# Patient Record
Sex: MALE | Race: WHITE
[De-identification: names, ages, dates, MRNs, and addresses within clinical notes are randomized per-mention and may not be internally consistent; named-entity substitution may affect disease eponyms.]

---

## 2017-12-29 ENCOUNTER — HOSPITAL ENCOUNTER (EMERGENCY)
Dept: HOSPITAL 75 - ER | Age: 22
LOS: 1 days | Discharge: HOME | End: 2017-12-30
Payer: SELF-PAY

## 2017-12-29 VITALS — BODY MASS INDEX: 28.93 KG/M2 | WEIGHT: 180 LBS | HEIGHT: 66 IN

## 2017-12-29 DIAGNOSIS — N45.1: Primary | ICD-10-CM

## 2017-12-29 DIAGNOSIS — F12.10: ICD-10-CM

## 2017-12-29 DIAGNOSIS — F15.10: ICD-10-CM

## 2017-12-29 DIAGNOSIS — F17.210: ICD-10-CM

## 2017-12-29 LAB
ALBUMIN SERPL-MCNC: 4.3 GM/DL (ref 3.2–4.5)
ALP SERPL-CCNC: 90 U/L (ref 40–136)
ALT SERPL-CCNC: 17 U/L (ref 0–55)
APTT PPP: YELLOW S
BACTERIA #/AREA URNS HPF: (no result) /HPF
BASOPHILS # BLD AUTO: 0.1 10^3/UL (ref 0–0.1)
BASOPHILS NFR BLD AUTO: 0 % (ref 0–10)
BILIRUB SERPL-MCNC: 0.8 MG/DL (ref 0.1–1)
BILIRUB UR QL STRIP: NEGATIVE
BUN/CREAT SERPL: 11
CALCIUM SERPL-MCNC: 9.5 MG/DL (ref 8.5–10.1)
CHLORIDE SERPL-SCNC: 103 MMOL/L (ref 98–107)
CO2 SERPL-SCNC: 24 MMOL/L (ref 21–32)
CREAT SERPL-MCNC: 0.76 MG/DL (ref 0.6–1.3)
EOSINOPHIL # BLD AUTO: 0.1 10^3/UL (ref 0–0.3)
EOSINOPHIL NFR BLD AUTO: 1 % (ref 0–10)
ERYTHROCYTE [DISTWIDTH] IN BLOOD BY AUTOMATED COUNT: 13.4 % (ref 10–14.5)
FIBRINOGEN PPP-MCNC: CLEAR MG/DL
GFR SERPLBLD BASED ON 1.73 SQ M-ARVRAT: > 60 ML/MIN
GLUCOSE SERPL-MCNC: 108 MG/DL (ref 70–105)
GLUCOSE UR STRIP-MCNC: NEGATIVE MG/DL
HCT VFR BLD CALC: 44 % (ref 40–54)
HGB BLD-MCNC: 15.6 G/DL (ref 13.3–17.7)
KETONES UR QL STRIP: NEGATIVE
LEUKOCYTE ESTERASE UR QL STRIP: (no result)
LYMPHOCYTES # BLD AUTO: 2.3 X 10^3 (ref 1–4)
LYMPHOCYTES NFR BLD AUTO: 13 % (ref 12–44)
MANUAL DIFFERENTIAL PERFORMED BLD QL: YES
MCH RBC QN AUTO: 31 PG (ref 25–34)
MCHC RBC AUTO-ENTMCNC: 36 G/DL (ref 32–36)
MCV RBC AUTO: 85 FL (ref 80–99)
MONOCYTES # BLD AUTO: 2 X 10^3 (ref 0–1)
MONOCYTES NFR BLD AUTO: 11 % (ref 0–12)
MONOCYTES NFR BLD: 10 %
NEUTROPHILS # BLD AUTO: 13.3 X 10^3 (ref 1.8–7.8)
NEUTROPHILS NFR BLD AUTO: 75 % (ref 42–75)
NEUTS BAND NFR BLD MANUAL: 78 %
NEUTS BAND NFR BLD: 2 %
NITRITE UR QL STRIP: NEGATIVE
PH UR STRIP: 6 [PH] (ref 5–9)
PLATELET # BLD: 265 10^3/UL (ref 130–400)
PMV BLD AUTO: 10.3 FL (ref 7.4–10.4)
POTASSIUM SERPL-SCNC: 3.6 MMOL/L (ref 3.6–5)
PROT SERPL-MCNC: 7.8 GM/DL (ref 6.4–8.2)
PROT UR QL STRIP: NEGATIVE
RBC # BLD AUTO: 5.12 10^6/UL (ref 4.35–5.85)
RBC #/AREA URNS HPF: (no result) /HPF
RBC MORPH BLD: NORMAL
SODIUM SERPL-SCNC: 140 MMOL/L (ref 135–145)
SP GR UR STRIP: 1.02 (ref 1.02–1.02)
UROBILINOGEN UR-MCNC: 1 MG/DL
VARIANT LYMPHS NFR BLD MANUAL: 10 %
WBC # BLD AUTO: 17.8 10^3/UL (ref 4.3–11)
WBC #/AREA URNS HPF: (no result) /HPF

## 2017-12-29 PROCEDURE — 80053 COMPREHEN METABOLIC PANEL: CPT

## 2017-12-29 PROCEDURE — 87591 N.GONORRHOEAE DNA AMP PROB: CPT

## 2017-12-29 PROCEDURE — 85007 BL SMEAR W/DIFF WBC COUNT: CPT

## 2017-12-29 PROCEDURE — 36415 COLL VENOUS BLD VENIPUNCTURE: CPT

## 2017-12-29 PROCEDURE — 87491 CHLMYD TRACH DNA AMP PROBE: CPT

## 2017-12-29 PROCEDURE — 86141 C-REACTIVE PROTEIN HS: CPT

## 2017-12-29 PROCEDURE — 76870 US EXAM SCROTUM: CPT

## 2017-12-29 PROCEDURE — 85027 COMPLETE CBC AUTOMATED: CPT

## 2017-12-29 PROCEDURE — 81000 URINALYSIS NONAUTO W/SCOPE: CPT

## 2017-12-29 PROCEDURE — 99284 EMERGENCY DEPT VISIT MOD MDM: CPT

## 2017-12-29 PROCEDURE — 87088 URINE BACTERIA CULTURE: CPT

## 2017-12-29 NOTE — ED GU-MALE
General


Stated Complaint:  SWOLLEN TESTICLE


Source:  patient


Exam Limitations:  no limitations





History of Present Illness


Time seen by provider:  22:12


Initial Comments


Patient present to ER by private conveyance with a chief complaint that about 1 

daily started having some swelling and pain in his left testicle. He said 

several years ago he had a mass in his scrotum and was diagnosed as an inguinal 

hernia and he had it repaired. No other surgical or past medical history 

significant. He is not having any fevers, chills, nausea, vomiting, diarrhea, 

constipation, dysuria, discharge. He is sexually active with women only and 

uses a condom sometimes. None of his partners have told him that they have an 

STD.





Allergies and Home Medications


Allergies


Coded Allergies:  


     No Known Drug Allergies (Unverified , 3/4/15)





Home Medications


No Active Prescriptions or Reported Meds





Constitutional:  No chills, No diaphoresis, No fever, No malaise


Respiratory:  No cough, No short of breath


Cardiovascular:  No chest pain, No palpitations


Gastrointestinal:  No abdominal pain, No constipation, No diarrhea, No nausea, 

No vomiting


Genitourinary:  see HPI, denies burning, denies discharge, denies dysuria, pain 

(left testicle with swelling)


Musculoskeletal:  No back pain, No joint pain


Skin:  No pruritus, No rash


Psychiatric/Neurological:  Denies Headache, Denies Numbness, Denies Paresthesia





Past Medical-Social-Family Hx


Patient Social History


Alcohol Use:  Denies Use


Recreational Drug Use:  Yes (marijuana regularly and methamphetamine last use 

approximately 2 weeks ago.)


Smoking Status:  Current Everyday Smoker


Type Used:  Cigarettes (0.5 ppd)


Recent Foreign Travel:  No


Contact w/Someone Who Travel:  No


Recent Hopitalizations:  No





Seasonal Allergies


Seasonal Allergies:  No





Reproductive System


Hx Reproductive Disorders:  No





Physical Exam


Vital Signs





Vital Sign - Last 12Hours








 12/29/17





 22:10


 


Temp 99.0


 


Pulse 120


 


Resp 18


 


B/P (MAP) 146/99 (115)


 


Pulse Ox 99


 


O2 Delivery Room Air





Capillary Refill :


General Appearance:  WD/WN, no apparent distress


HEENT:  PERRL/EOMI, pharynx normal


Gastrointestinal:  non tender, soft


Male:  inguinal tenderness (left inguinal  but no palpable mass on 

either side on Valsalva maneuver.), testicular tenderness (left), other (penis 

is uncircumcised, normal without discharge, tenderness, erythema or lesion. 

Right testes unremarkable scrotum unremarkable and left testicle is firm 

swollen and tender with no palpable fluctuance, seroma, seminoma etc. palpable.)


Back:  normal inspection, no CVA tenderness


Neurologic/Psychiatric:  alert, normal mood/affect, oriented x 3


Skin:  normal color, warm/dry


Lymphatic:  no adenopathy





Progress/Results/Core Measures


Suspected Sepsis


SIRS


Temperature: 


Pulse:  


Respiratory Rate: 


 


Laboratory Tests


12/29/17 22:20: White Blood Count 17.8H


Blood Pressure  / 


Mean: 


 











Laboratory Tests


12/29/17 22:20: 


Creatinine 0.76, Platelet Count 265, Total Bilirubin 0.8





Results/Orders


Lab Results





Laboratory Tests








Test


  12/29/17


22:18 12/29/17


22:20 Range/Units


 


 


Urine Color YELLOW    


 


Urine Clarity CLEAR    


 


Urine pH 6   5-9  


 


Urine Specific Gravity 1.020   1.016-1.022  


 


Urine Protein NEGATIVE   NEGATIVE  


 


Urine Glucose (UA) NEGATIVE   NEGATIVE  


 


Urine Ketones NEGATIVE   NEGATIVE  


 


Urine Nitrite NEGATIVE   NEGATIVE  


 


Urine Bilirubin NEGATIVE   NEGATIVE  


 


Urine Urobilinogen 1   NORMAL  MG/DL


 


Urine Leukocyte Esterase 1+ H  NEGATIVE  


 


Urine RBC (Auto) 1+ H  NEGATIVE  


 


Urine RBC 0-2    /HPF


 


Urine WBC 5-10 H   /HPF


 


Urine Crystals NONE    /LPF


 


Urine Bacteria TRACE    /HPF


 


Urine Casts NONE    /LPF


 


Urine Mucus SMALL H   /LPF


 


Urine Culture Indicated YES    


 


White Blood Count


  


  17.8 H


  4.3-11.0


10^3/uL


 


Red Blood Count


  


  5.12 


  4.35-5.85


10^6/uL


 


Hemoglobin  15.6  13.3-17.7  G/DL


 


Hematocrit  44  40-54  %


 


Mean Corpuscular Volume  85  80-99  FL


 


Mean Corpuscular Hemoglobin  31  25-34  PG


 


Mean Corpuscular Hemoglobin


Concent 


  36 


  32-36  G/DL


 


 


Red Cell Distribution Width  13.4  10.0-14.5  %


 


Platelet Count


  


  265 


  130-400


10^3/uL


 


Mean Platelet Volume  10.3  7.4-10.4  FL


 


Neutrophils (%) (Auto)  75  42-75  %


 


Lymphocytes (%) (Auto)  13  12-44  %


 


Monocytes (%) (Auto)  11  0-12  %


 


Eosinophils (%) (Auto)  1  0-10  %


 


Basophils (%) (Auto)  0  0-10  %


 


Neutrophils # (Auto)  13.3 H 1.8-7.8  X 10^3


 


Lymphocytes # (Auto)  2.3  1.0-4.0  X 10^3


 


Monocytes # (Auto)  2.0 H 0.0-1.0  X 10^3


 


Eosinophils # (Auto)


  


  0.1 


  0.0-0.3


10^3/uL


 


Basophils # (Auto)


  


  0.1 


  0.0-0.1


10^3/uL


 


Neutrophils % (Manual)  78   %


 


Lymphocytes % (Manual)  10   %


 


Monocytes % (Manual)  10   %


 


Band Neutrophils  2   %


 


Blood Morphology Comment  NORMAL   


 


Sodium Level  140  135-145  MMOL/L


 


Potassium Level  3.6  3.6-5.0  MMOL/L


 


Chloride Level  103    MMOL/L


 


Carbon Dioxide Level  24  21-32  MMOL/L


 


Anion Gap  13  5-14  MMOL/L


 


Blood Urea Nitrogen  8  7-18  MG/DL


 


Creatinine


  


  0.76 


  0.60-1.30


MG/DL


 


Estimat Glomerular Filtration


Rate 


  > 60 


   


 


 


BUN/Creatinine Ratio  11   


 


Glucose Level  108 H   MG/DL


 


Calcium Level  9.5  8.5-10.1  MG/DL


 


Total Bilirubin  0.8  0.1-1.0  MG/DL


 


Aspartate Amino Transf


(AST/SGOT) 


  13 


  5-34  U/L


 


 


Alanine Aminotransferase


(ALT/SGPT) 


  17 


  0-55  U/L


 


 


Alkaline Phosphatase  90    U/L


 


C-Reactive Protein High


Sensitivity 


  12.47 H


  0.00-0.50


MG/DL


 


Total Protein  7.8  6.4-8.2  GM/DL


 


Albumin  4.3  3.2-4.5  GM/DL








My Orders





Orders - SUSANNA EDGAR


Ua Culture If Indicated (12/29/17 22:01)


Cbc With Automated Diff (12/29/17 22:17)


Comprehensive Metabolic Panel (12/29/17 22:17)


Us Scrotum (Testicle) 74390 (12/29/17 22:17)


Hs C Reactive Protein (12/29/17 22:17)


Ketorolac Injection (Toradol Injection) (12/29/17 22:30)


Neisseria Gonorrhea Dna (12/29/17 22:24)


Chlamydia Dna Urine Test (12/29/17 22:24)


Manual Differential (12/29/17 22:20)


Urine Culture (12/29/17 22:18)





Medications Given in ED





Current Medications








 Medications  Dose


 Ordered  Sig/Dianna


 Route  Start Time


 Stop Time Status Last Admin


Dose Admin


 


 Ketorolac


 Tromethamine  30 mg  ONCE  ONCE


 IM  12/29/17 22:30


 12/29/17 22:31 DC 12/29/17 22:26


30 MG








Vital Signs/I&O





Vital Sign - Last 12Hours








 12/29/17





 22:10


 


Temp 99.0


 


Pulse 120


 


Resp 18


 


B/P (MAP) 146/99 (115)


 


Pulse Ox 99


 


O2 Delivery Room Air





Capillary Refill :


Progress Note :  


   Time:  22:22


Progress Note


We will order an ultrasound of his scrotum and get some blood work looking for 

acute evidence of bacterial infection such as orchitis. We'll also get a 

gonorrhea and chlamydia tests sent out. Urinalysis. Patient's tachycardia seems 

to be due to his anxiety and pain. We'll give him some Toradol IM and some time 

and see if his vital signs stabilized. He is afebrile.





Diagnostic Imaging





   Diagonstic Imaging:  Ultrasound


   Plain Films/CT/US/NM/MRI:  other (scrotum)


Comments


Left epididymitis. No testicular torsion.


   Reviewed:  Reviewed by Me





Departure


Impression


Impression:  


 Primary Impression:  


 Epididymitis, left


Disposition:  01 HOME, SELF-CARE


Condition:  Improved





Departure-Patient Inst.


Decision time for Depature:  00:43


Referrals:  


NO,LOCAL PHYSICIAN (PCP/Family)


Primary Care Physician


Patient Instructions:  Epididymitis (DC)





Add. Discharge Instructions:  


Drink plenty of fluids use Tylenol 1000 mg every 8 hours as needed as well as 

ibuprofen 800 mg every 8 hours as needed for pain. He may ice the testes for 10 

minutes every 4 hours as needed for pain. If this does not control your pain 

you may also take one tablet of hydrocodone every 6 hours as needed. If not 

seeing some improvement by 3-4 days follow-up with your primary care physician 

or return to the ER for further evaluation.


Scripts


Hydrocodone Bit/Acetaminophen (Hydrocodone/Acetaminophen 5/325mg Tablet) 1 Tab 

Tab


1-2 EACH PO Q6H Y for BREAKTHROUGH PAIN, #15 TAB 0 Refills


   Prov: SUSANNA EDGAR         12/30/17











SUSANNA EDGAR Dec 29, 2017 22:24

## 2017-12-29 NOTE — XMS REPORT
[facility]

 Created on: 2016



Taz Mcmahon

External Reference #: [id number]

: 1995

Sex: Male



Demographics







 Address  [address] [place]  [address]

 

 Home Phone  [phone]

 

 Preferred Language  Unknown

 

 Marital Status  Unknown

 

 Religion Affiliation  Unknown

 

 Race  White

 

 Ethnic Group   or 





Author







 MARTÍN Grissom

 

 [facility]  e[employer]

 

 Address  Unknown

 

 Phone  Unavailable







Care Team Providers







 Care Team Member Name  Role  Phone

 

 MARTÍN ALEMAN  CP  Unavailable



                                                                



Allergies, Adverse Reactions, Alerts

          





 Substance  Reaction  Event Type

 

 N.K.D.A.  Info Not Available  Non Drug Allergy



                                                                               
         



Problems

          





 Problem Type  Condition  Code  Onset Dates  Condition Status

 

 Problem  Pilonidal cyst with abscess  685.0     Active

 

 Problem  Inguinal hernia without mention of obstruction or gangrene, 
unilateral or unspecified, (not specified as recurrent)  550.90     Active

 

 Problem  Pilonidal cyst without mention of abscess  685.1     Active

 

 Assessment  Tinea cruris  B35.6     Active

 

 Assessment  STD exposure  Z20.2     Active



                                                                               
                                                 



Medications

          No Known Medications                                                 
                                       



Procedures

          





 Procedure  Coding System  Code  Date

 

 Office Visit, Est Pt., Level 3  CPT-4  48111  2016



                                                                               
                   



Vital Signs

          





 Date/Time:  2016

 

 Temperature  98.9 F

 

 Weight  182.4 lbs

 

 Height  67 in

 

 BMI  28.56 Index

 

 Blood Pressure Diastolic  86 mmHg

 

 Blood Pressure Systolic  130 mmHg

 

 Cardiac Monitoring Heart Rate  112 bpm



                                                                              



Results

          No Known Results                                                     
               



Summary Purpose

          eClinicalWorks Submission

## 2017-12-29 NOTE — XMS REPORT
Continuity of Care Document

 Created on: 2017



CESAR ROSA

External Reference #: [id number]

: 1995

Sex: Male



Demographics







 Address  [address] [place] [address] [address]

[place]  [address]

 

 Home Phone  [phone] x

 

 Preferred Language  Unknown

 

 Marital Status  Unknown

 

 Congregation Affiliation  Unknown

 

 Race  Unknown

 

 Ethnic Group  Unknown





Author







 Author  [facility]

 

 Address  Unknown

 

 Phone  Unavailable



              



Allergies

      



There is no data.                  



Medications

      



There is no data.                  



Problems

      





 Date Dx Coded            Attending            Type            Code            
Diagnosis            Diagnosed By        

 

 2013                                      685.1            PILONIDAL 
CYST                     

 

 2013            FELIPE MCDOWELL DO                         685.1          
  PILONIDAL CYST                     

 

 2014            FELIPE MCDOWELL DO                         550.90         
   UNILATERAL OR UNSPECIFIED INGUINAL HERNIA WITHOUT OBSTRUCTION OR GANGRENE   
                  

 

 2014            FELIPE MCDOWELL DO                         685.0          
  PILONIDAL CYST WITH ABSCESS                     



                        



Procedures

      





 Code            Description            Performed By            Performed On   
     

 

             GENERAL DHARA ALMENDAREZ          
                         2014        



                  



Results

      



There is no data.              



Encounters

      





 ACCT No.            Visit Date/Time            Discharge            Status    
        Pt. Type            Provider            Facility            Loc./Unit  
          Complaint        

 

 [id number]            2014 13:41:00            2014 23:59:59          
  [facility]            Outpatient            FELIPE MCDOWELL DO                        
                       

 

 [id number]            2013 13:25:00            2013 23:59:59          
  [facility]            Outpatient

## 2017-12-30 VITALS — DIASTOLIC BLOOD PRESSURE: 88 MMHG | SYSTOLIC BLOOD PRESSURE: 132 MMHG

## 2017-12-30 NOTE — DIAGNOSTIC IMAGING REPORT
Scrotal ultrasound.



INDICATION: Left testicular pain.



Spectral and color flow imaging of the testicles was performed.

There are no prior studies available for comparison.



FINDINGS: Both testicles were identified. Right testicle measures

4.2 x 2.2 x 2.3 cm while left testicle is estimated to be 3.6 x

2.4 x 2.9 cm. There is no evidence for solid testicular mass and

there is no sign of torsion.



There is increased vascularity associated with the epididymis on

the left and the epididymis does appear to be enlarged. These

findings would be consistent with acute epididymitis. The

epididymis on the right is unremarkable.



There is no significant hydrocele formation.



IMPRESSION:

1. There is no evidence for a solid testicular mass and there is

no sign of torsion.

2. The appearance of the left epididymis does suggest

epididymitis. Clinical followup is recommended. 



Dictated by: 



  Dictated on workstation # [id number]

## 2018-03-01 ENCOUNTER — HOSPITAL ENCOUNTER (EMERGENCY)
Dept: HOSPITAL 75 - ER | Age: 23
Discharge: HOME | End: 2018-03-01
Payer: SELF-PAY

## 2018-03-01 VITALS — HEIGHT: 66 IN | WEIGHT: 180 LBS | BODY MASS INDEX: 28.93 KG/M2

## 2018-03-01 VITALS — DIASTOLIC BLOOD PRESSURE: 72 MMHG | SYSTOLIC BLOOD PRESSURE: 133 MMHG

## 2018-03-01 DIAGNOSIS — F17.210: ICD-10-CM

## 2018-03-01 DIAGNOSIS — L05.01: Primary | ICD-10-CM

## 2018-03-01 DIAGNOSIS — F15.10: ICD-10-CM

## 2018-03-01 DIAGNOSIS — F12.10: ICD-10-CM

## 2018-03-01 PROCEDURE — 96372 THER/PROPH/DIAG INJ SC/IM: CPT

## 2018-03-01 NOTE — ED INTEGUMENTARY GENERAL
General


Chief Complaint:  Skin/Wound Problems


Stated Complaint:  ABSCESS ON LEFT BUTTOCK


Nursing Triage Note:  


PT REPORTS PYLONIDAL CYST.


Source:  patient


Exam Limitations:  no limitations





History of Present Illness


Date Seen by Provider:  Mar 1, 2018


Time Seen by Provider:  02:30


Initial Comments


Patient presents to the ER by private conveyance with a chief complaint of pain 

and feeling a little wetness and swelling between his gluteal cleft where he 

has a history of pilonidal cyst/abscess is. He is not having any fever, chills, 

nausea, vomiting, diarrhea, dysuria. He's had this drained 4-5 times in the 

past with the last time being several months ago. He has not been to general 

surgery. He says when it comes on usually goes to the ER to have it drained. He 

does not have allergies to antibiotics. He does not smoke cigarettes.





Allergies and Home Medications


Allergies


Coded Allergies:  


     No Known Drug Allergies (Unverified , 3/4/15)





Home Medications


Hydrocodone Bit/Acetaminophen 1 Tab Tab, 1-2 EACH PO Q6H PRN for BREAKTHROUGH 

PAIN


   Prescribed by: SUSANNA EDGAR on 12/30/17 0044





Constitutional:  No chills, No diaphoresis, No fever


EENTM:  No ear discharge, No ear pain


Respiratory:  No cough, No phlegm


Cardiovascular:  No chest pain, No palpitations


Gastrointestinal:  No abdominal pain, No constipation, No diarrhea, No nausea


Genitourinary:  No discharge, No dysuria





Past Medical-Social-Family Hx


Patient Social History


Alcohol Use:  Denies Use


Recreational Drug Use:  No


Drug of Choice:  CANNIBUS, METH


Smoking Status:  Current Everyday Smoker


Type Used:  Cigarettes


Recent Foreign Travel:  No


Contact w/Someone Who Travel:  No


Recent Infectious Disease Expo:  No


Recent Hopitalizations:  No





Immunizations Up To Date


Tetanus Booster (TDap):  Unknown





Seasonal Allergies


Seasonal Allergies:  No





Surgeries


History of Surgeries:  No





Respiratory


History of Respiratory Disorde:  No





Cardiovascular


History of Cardiac Disorders:  No





Neurological


History of Neurological Disord:  No





Reproductive System


Hx Reproductive Disorders:  No





Genitourinary


History of Genitourinary Disor:  No





Gastrointestinal


History of Gastrointestinal Di:  No





Musculoskeletal


History of Musculoskeletal Dis:  No





Endocrine


History of Endocrine Disorders:  No





HEENT


History of HEENT Disorders:  No





Cancer


History of Cancer:  No





Psychosocial


History of Psychiatric Problem:  No





Integumentary


History of Skin or Integumenta:  Yes (PILONIDIAL ABSCESS I&D)





Blood Transfusions


History of Blood Disorders:  No





Physical Exam


Vital Signs





Vital Signs - First Documented








 3/1/18





 02:15


 


Temp 98.0


 


Pulse 74


 


Resp 16


 


B/P (MAP) 133/72 (92)


 


Pulse Ox 99


 


O2 Delivery Room Air





Capillary Refill : Less Than 3 Seconds


General Appearance:  WD/WN, no apparent distress


HEENT:  PERRL/EOMI, pharynx normal


Cardiovascular:  normal peripheral pulses, regular rate, rhythm


Respiratory:  chest non-tender, lungs clear


Gastrointestinal:  normal bowel sounds, non tender, soft


Neurologic/Psychiatric:  alert, oriented x 3


Skin:  other (pilonidal cyst in the superior gluteal cleft with 2 pores about 

drain purulent material and mild surrounding erythematous tissue.)





I&D :  


   Blade Size:  11


   I & D Procedure:  betadine prep (chlorhexidine soap water)


Progress


Wounds were cleaned thoroughly with physical debridement, chlorhexidine and 

then Betadine swabs. The wound was then infiltrated around the pore with 4 cc 

of 1% lidocaine and no epinephrine. When the patient was found to be numb and 

11 blade scalpel was easily passed through the pore draining minimal purulent 

material. A cotton tip swab was used to attempt to break up any loculations the 

patient was not tolerating any further probing. A second pore was opened with a 

cross wise incision using 11 blade and no purulence drained. Loose gauze 

dressing was placed and the patient tolerated the procedure well.





Progress/Results/Core Measures


Results/Orders


My Orders





Orders - SUSANNA EDGAR


Lidocaine 1% (Xylocaine 1%) (3/1/18 02:30)


Ketorolac Injection (Toradol Injection) (3/1/18 02:30)





Vital Signs/I&O





Vital Sign - Last 12Hours








 3/1/18





 02:15


 


Temp 98.0


 


Pulse 74


 


Resp 16


 


B/P (MAP) 133/72 (92)


 


Pulse Ox 99


 


O2 Delivery Room Air














Blood Pressure Mean:  92








Progress Note :  


   Time:  02:54


Progress Note


Unable to get satisfactory drainage and satisfactory anesthesia. We'll refer to 

the general surgeon on-call.





Departure


Impression


Impression:  


 Primary Impression:  


 Infected pilonidal cyst


Disposition:  01 HOME, SELF-CARE


Condition:  Stable





Departure-Patient Inst.


Decision time for Depature:  02:54


Referrals:  


NO,LOCAL PHYSICIAN (PCP)


Primary Care Physician


Patient Instructions:  Pilonidal Cyst (DC)





Add. Discharge Instructions:  


This morning at 8:00 please call Dr. Gonzalez, general surgeon at [phone] today to 

set up a incision and drainage of your pilonidal cyst.  the antibiotics 

and take one tablet twice a day to completion to prevent worsening infection. 

Use the pain medicine one tablet every 6 hours as needed in addition to 

ibuprofen 800 mg every 8 hours.





All discharge instructions reviewed with patient and/or family. Voiced 

understanding.


Scripts


Sulfamethoxazole/Trimethoprim (Bactrim Ds Tablet) 1 Each Tablet


1 EACH PO BID for 7 Days, #14 TAB 0 Refills


   Prov: SUSANNA EDGAR         3/1/18 


Hydrocodone Bit/Acetaminophen (Hydrocodone/Acetaminophen 5/325mg Tablet) 1 Tab 

Tab


1 EACH PO Q6H Y for BREAKTHROUGH PAIN, #10 TAB 0 Refills


   Prov: SUSANNA EDGAR         3/1/18


Work/School Note:  Work Release Form   Date Seen in the Emergency Department:  

Mar 1, 2018


   Return to Work:  Mar 5, 2018


   Restrictions:  No Restrictions





Copy


Copies To 1:   JAY GONZALEZ MD, TITUS J Mar 1, 2018 02:47

## 2018-03-01 NOTE — XMS REPORT
Continuity of Care Document

 Created on: 2018



CESAR ROSA

External Reference #: [id number]

: 1995

Sex: Male



Demographics







 Address  [address] [place] [address] [address]

[place]  [address]

 

 Home Phone  [phone] x

 

 Preferred Language  Unknown

 

 Marital Status  Unknown

 

 Latter day Affiliation  Unknown

 

 Race  Unknown

 

 Ethnic Group  Unknown





Author







 Author  [facility]

 

 Address  Unknown

 

 Phone  Unavailable



              



Allergies

      



There is no data.                  



Medications

      



There is no data.                  



Problems

      





 Date Dx Coded            Attending            Type            Code            
Diagnosis            Diagnosed By        

 

 2013                                      685.1            PILONIDAL 
CYST                     

 

 2013            FELIPE MCDOWELL DO                         685.1          
  PILONIDAL CYST                     

 

 2014            FELIPE MCDOWELL DO                         550.90         
   UNILATERAL OR UNSPECIFIED INGUINAL HERNIA WITHOUT OBSTRUCTION OR GANGRENE   
                  

 

 2014            FELIPE MCDOWELL DO                         685.0          
  PILONIDAL CYST WITH ABSCESS                     



                        



Procedures

      





 Code            Description            Performed By            Performed On   
     

 

             GENERAL DHARA ALMENDAREZ          
                         2014        



                  



Results

      



There is no data.              



Encounters

      





 ACCT No.            Visit Date/Time            Discharge            Status    
        Pt. Type            Provider            Facility            Loc./Unit  
          Complaint        

 

 [id number]            2014 13:41:00            2014 23:59:59          
  [facility]            Outpatient            FELIPE MCDOWELL DO                        
                       

 

 [id number]            2013 13:25:00            2013 23:59:59          
  [facility]            Outpatient